# Patient Record
Sex: MALE | Race: WHITE | Employment: FULL TIME | URBAN - METROPOLITAN AREA
[De-identification: names, ages, dates, MRNs, and addresses within clinical notes are randomized per-mention and may not be internally consistent; named-entity substitution may affect disease eponyms.]

---

## 2019-09-02 LAB
CHOLESTEROL, TOTAL: 148 MG/DL
CHOLESTEROL/HDL RATIO: 5.3
GLUCOSE BLD-MCNC: 93 MG/DL
HDLC SERPL-MCNC: 28 MG/DL (ref 35–70)
LDL CHOLESTEROL CALCULATED: ABNORMAL
TRIGL SERPL-MCNC: 45 MG/DL
VLDLC SERPL CALC-MCNC: ABNORMAL MG/DL

## 2019-11-21 ENCOUNTER — OFFICE VISIT (OUTPATIENT)
Dept: PRIMARY CARE CLINIC | Age: 29
End: 2019-11-21
Payer: COMMERCIAL

## 2019-11-21 VITALS
HEIGHT: 69 IN | DIASTOLIC BLOOD PRESSURE: 78 MMHG | OXYGEN SATURATION: 98 % | WEIGHT: 197.4 LBS | RESPIRATION RATE: 17 BRPM | SYSTOLIC BLOOD PRESSURE: 118 MMHG | TEMPERATURE: 98.1 F | HEART RATE: 72 BPM | BODY MASS INDEX: 29.24 KG/M2

## 2019-11-21 DIAGNOSIS — K58.8 OTHER IRRITABLE BOWEL SYNDROME: ICD-10-CM

## 2019-11-21 DIAGNOSIS — K14.8 TONGUE LESION: Primary | ICD-10-CM

## 2019-11-21 PROBLEM — K58.9 IRRITABLE BOWEL DISEASE: Status: ACTIVE | Noted: 2019-11-21

## 2019-11-21 PROCEDURE — 99203 OFFICE O/P NEW LOW 30 MIN: CPT | Performed by: INTERNAL MEDICINE

## 2019-11-21 SDOH — HEALTH STABILITY: MENTAL HEALTH: HOW OFTEN DO YOU HAVE A DRINK CONTAINING ALCOHOL?: NEVER

## 2019-11-21 ASSESSMENT — ENCOUNTER SYMPTOMS
ABDOMINAL PAIN: 0
WHEEZING: 0
CHEST TIGHTNESS: 0
SORE THROAT: 0
SINUS PRESSURE: 0
SINUS PAIN: 0
VOMITING: 0
RHINORRHEA: 0
COUGH: 0
EYE DISCHARGE: 0
EYE PAIN: 0
BLOOD IN STOOL: 0
SHORTNESS OF BREATH: 0
TROUBLE SWALLOWING: 0
NAUSEA: 0

## 2019-11-26 ENCOUNTER — OFFICE VISIT (OUTPATIENT)
Dept: ENT CLINIC | Age: 29
End: 2019-11-26
Payer: COMMERCIAL

## 2019-11-26 VITALS
BODY MASS INDEX: 27.46 KG/M2 | WEIGHT: 191.8 LBS | DIASTOLIC BLOOD PRESSURE: 68 MMHG | HEART RATE: 72 BPM | SYSTOLIC BLOOD PRESSURE: 105 MMHG | HEIGHT: 70 IN | TEMPERATURE: 97 F

## 2019-11-26 DIAGNOSIS — K14.8 TONGUE LESION: Primary | ICD-10-CM

## 2019-11-26 DIAGNOSIS — G47.30 SLEEP-DISORDERED BREATHING: ICD-10-CM

## 2019-11-26 DIAGNOSIS — J35.8 TONSIL STONE: ICD-10-CM

## 2019-11-26 PROCEDURE — 99204 OFFICE O/P NEW MOD 45 MIN: CPT | Performed by: OTOLARYNGOLOGY

## 2019-12-05 ENCOUNTER — TELEPHONE (OUTPATIENT)
Dept: ENT CLINIC | Age: 29
End: 2019-12-05

## 2019-12-09 ENCOUNTER — OFFICE VISIT (OUTPATIENT)
Dept: ENT CLINIC | Age: 29
End: 2019-12-09
Payer: COMMERCIAL

## 2019-12-09 VITALS
BODY MASS INDEX: 28.2 KG/M2 | WEIGHT: 197 LBS | HEART RATE: 90 BPM | SYSTOLIC BLOOD PRESSURE: 128 MMHG | TEMPERATURE: 97.3 F | DIASTOLIC BLOOD PRESSURE: 72 MMHG | HEIGHT: 70 IN

## 2019-12-09 DIAGNOSIS — K14.0 GLOSSITIS: Primary | ICD-10-CM

## 2019-12-09 DIAGNOSIS — J35.8 TONSIL STONE: ICD-10-CM

## 2019-12-09 DIAGNOSIS — K14.0 GLOSSITIS: ICD-10-CM

## 2019-12-09 DIAGNOSIS — R06.83 PRIMARY SNORING: ICD-10-CM

## 2019-12-09 PROCEDURE — 99213 OFFICE O/P EST LOW 20 MIN: CPT | Performed by: OTOLARYNGOLOGY

## 2019-12-10 LAB
ANTI-NUCLEAR ANTIBODY (ANA): NEGATIVE
FOLATE: 7.62 NG/ML (ref 4.78–24.2)
IRON SATURATION: 30 % (ref 20–50)
IRON: 81 UG/DL (ref 59–158)
TOTAL IRON BINDING CAPACITY: 272 UG/DL (ref 260–445)
VITAMIN B-12: 419 PG/ML (ref 211–911)

## 2019-12-11 ENCOUNTER — OFFICE VISIT (OUTPATIENT)
Dept: PRIMARY CARE CLINIC | Age: 29
End: 2019-12-11
Payer: COMMERCIAL

## 2019-12-11 VITALS
BODY MASS INDEX: 27.66 KG/M2 | DIASTOLIC BLOOD PRESSURE: 82 MMHG | SYSTOLIC BLOOD PRESSURE: 112 MMHG | WEIGHT: 190 LBS | OXYGEN SATURATION: 98 % | TEMPERATURE: 97.6 F | HEART RATE: 67 BPM

## 2019-12-11 DIAGNOSIS — M79.642 HAND PAIN, LEFT: ICD-10-CM

## 2019-12-11 PROCEDURE — 99212 OFFICE O/P EST SF 10 MIN: CPT | Performed by: INTERNAL MEDICINE

## 2019-12-11 ASSESSMENT — PATIENT HEALTH QUESTIONNAIRE - PHQ9
2. FEELING DOWN, DEPRESSED OR HOPELESS: 0
SUM OF ALL RESPONSES TO PHQ QUESTIONS 1-9: 0
SUM OF ALL RESPONSES TO PHQ QUESTIONS 1-9: 0
SUM OF ALL RESPONSES TO PHQ9 QUESTIONS 1 & 2: 0
1. LITTLE INTEREST OR PLEASURE IN DOING THINGS: 0

## 2019-12-30 ENCOUNTER — TELEPHONE (OUTPATIENT)
Dept: ENT CLINIC | Age: 29
End: 2019-12-30

## 2019-12-30 NOTE — TELEPHONE ENCOUNTER
Having issues with his tongue again, but is out of town (in [place]) and would like to get a biopsy done out there so really just needs to talk to Dr. Rigo Clay. He also said that he was going to start calling places around 11 am to see if he can get in before the end of the year. Please call back at the following number [phone].

## 2019-12-31 NOTE — TELEPHONE ENCOUNTER
Patient called this morning, he would like to speak with Dr Sallie Villafana before 10:30 am if possible. Please call patient at [phone].

## 2020-10-22 ENCOUNTER — OFFICE VISIT (OUTPATIENT)
Dept: PRIMARY CARE CLINIC | Age: 30
End: 2020-10-22
Payer: COMMERCIAL

## 2020-10-22 VITALS
DIASTOLIC BLOOD PRESSURE: 72 MMHG | WEIGHT: 175 LBS | BODY MASS INDEX: 25.05 KG/M2 | SYSTOLIC BLOOD PRESSURE: 124 MMHG | TEMPERATURE: 98.1 F | OXYGEN SATURATION: 98 % | HEART RATE: 65 BPM | HEIGHT: 70 IN | RESPIRATION RATE: 14 BRPM

## 2020-10-22 PROBLEM — M79.642 HAND PAIN, LEFT: Status: RESOLVED | Noted: 2019-12-11 | Resolved: 2020-10-22

## 2020-10-22 PROBLEM — L20.84 INTRINSIC ECZEMA: Status: ACTIVE | Noted: 2020-10-22

## 2020-10-22 PROBLEM — K58.9 IRRITABLE BOWEL DISEASE: Status: RESOLVED | Noted: 2019-11-21 | Resolved: 2020-10-22

## 2020-10-22 PROCEDURE — G8484 FLU IMMUNIZE NO ADMIN: HCPCS | Performed by: INTERNAL MEDICINE

## 2020-10-22 PROCEDURE — 99395 PREV VISIT EST AGE 18-39: CPT | Performed by: INTERNAL MEDICINE

## 2020-10-22 RX ORDER — TRIAMCINOLONE ACETONIDE 5 MG/G
OINTMENT TOPICAL
Qty: 30 G | Refills: 0 | Status: SHIPPED | OUTPATIENT
Start: 2020-10-22 | End: 2020-10-29

## 2020-10-22 SDOH — HEALTH STABILITY: PHYSICAL HEALTH: ON AVERAGE, HOW MANY MINUTES DO YOU ENGAGE IN EXERCISE AT THIS LEVEL?: 40 MIN

## 2020-10-22 SDOH — HEALTH STABILITY: PHYSICAL HEALTH: ON AVERAGE, HOW MANY DAYS PER WEEK DO YOU ENGAGE IN MODERATE TO STRENUOUS EXERCISE (LIKE A BRISK WALK)?: 4 DAYS

## 2020-10-22 ASSESSMENT — PATIENT HEALTH QUESTIONNAIRE - PHQ9
SUM OF ALL RESPONSES TO PHQ9 QUESTIONS 1 & 2: 0
1. LITTLE INTEREST OR PLEASURE IN DOING THINGS: 0
SUM OF ALL RESPONSES TO PHQ QUESTIONS 1-9: 0
SUM OF ALL RESPONSES TO PHQ QUESTIONS 1-9: 0
2. FEELING DOWN, DEPRESSED OR HOPELESS: 0
SUM OF ALL RESPONSES TO PHQ QUESTIONS 1-9: 0

## 2020-10-22 ASSESSMENT — ENCOUNTER SYMPTOMS
ABDOMINAL PAIN: 0
CHEST TIGHTNESS: 0
EYE PAIN: 0
TROUBLE SWALLOWING: 0
VOMITING: 0
WHEEZING: 0
RHINORRHEA: 0
EYE DISCHARGE: 0
SINUS PRESSURE: 0
SHORTNESS OF BREATH: 0
NAUSEA: 0
SORE THROAT: 0
SINUS PAIN: 0
COUGH: 0
BLOOD IN STOOL: 0

## 2020-10-22 NOTE — PATIENT INSTRUCTIONS
[place] with all fiber 1 cereal every morning. Smooth texture Metamucil 1 tablespoon once a day. Increase fiber to help with the bowels. Alternating Eucerin cream with triamcinolone cream twice a day. Not much soap on the rash area. If not better call us and we can get him to see dermatologist.    Keep 4 days a week 3 to 4 miles cardio exercise.

## 2020-10-22 NOTE — PROGRESS NOTES
facial asymmetry, weakness, light-headedness, numbness and headaches. Psychiatric/Behavioral: Negative for confusion. No current outpatient medications on file prior to visit. No current facility-administered medications on file prior to visit. Past Medical History:   Diagnosis Date    Allergic rhinitis     Fracture of nasal bone     Intrinsic eczema 10/22/2020      Social History     Tobacco Use    Smoking status: Never Smoker    Smokeless tobacco: Never Used   Substance Use Topics    Alcohol use: Never     Frequency: Never      Family History   Problem Relation Age of Onset    Other Mother         sleep apnea    Sleep Apnea Father     Stroke Paternal Grandfather     Other Other         Vitals:    10/22/20 1119   BP: 124/72   Site: Right Upper Arm   Position: Sitting   Cuff Size: Medium Adult   Pulse: 65   Resp: 14   Temp: 98.1 °F (36.7 °C)   TempSrc: Temporal   SpO2: 98%   Weight: 175 lb (79.4 kg)   Height: 5' 9.8\" (1.773 m)     Estimated body mass index is 25.25 kg/m² as calculated from the following:    Height as of this encounter: 5' 9.8\" (1.773 m). Weight as of this encounter: 175 lb (79.4 kg). Physical Exam  Vitals signs and nursing note reviewed. Constitutional:       General: He is not in acute distress. HENT:      Head: Normocephalic and atraumatic. Right Ear: External ear normal.      Left Ear: External ear normal.   Eyes:      General: Lids are normal.      Conjunctiva/sclera: Conjunctivae normal.      Pupils: Pupils are equal, round, and reactive to light. Neck:      Musculoskeletal: Neck supple. Thyroid: No thyromegaly. Vascular: No JVD. Trachea: No tracheal deviation. Cardiovascular:      Rate and Rhythm: Normal rate and regular rhythm. Heart sounds: Normal heart sounds. No gallop. Pulmonary:      Effort: Pulmonary effort is normal. No respiratory distress. Breath sounds: Normal breath sounds. No wheezing or rales.    Abdominal: General: Bowel sounds are normal.      Palpations: Abdomen is soft. There is no mass. Tenderness: There is no abdominal tenderness. Musculoskeletal:         General: No tenderness. Comments: No leg edema or calf tenderness   Lymphadenopathy:      Cervical: No cervical adenopathy. Skin:     General: Skin is warm and dry. Findings: Rash (  Right buttock top of the intergluteal area eczematous rash-kacey size) present. Neurological:      Mental Status: He is alert and oriented to person, place, and time. Cranial Nerves: No cranial nerve deficit. Sensory: No sensory deficit. Psychiatric:         Behavior: Behavior normal.         Thought Content: Thought content normal.         ASSESSMENT/PLAN:  1. Annual physical exam  As explained    2. Intrinsic eczema    - triamcinolone (ARISTOCORT) 0.5 % ointment; Apply topically 2 times daily. Dispense: 30 g; Refill: 0      Return in about 1 year (around 10/22/2021) for Annual follow-up. Patient Instructions   [place] with all fiber 1 cereal every morning. Smooth texture Metamucil 1 tablespoon once a day. Increase fiber to help with the bowels. Alternating Eucerin cream with triamcinolone cream twice a day. Not much soap on the rash area. If not better call us and we can get him to see dermatologist.    Keep 4 days a week 3 to 4 miles cardio exercise. Electronically signed by Elfego Miguel MD on 10/22/2020 at 11:51 AM     This dictation was generated by voice recognition computer software. Although all attempts are made to edit the dictation for accuracy, there may be errors in the transcription that are not intended.

## 2021-04-15 ENCOUNTER — OFFICE VISIT (OUTPATIENT)
Dept: PRIMARY CARE CLINIC | Age: 31
End: 2021-04-15
Payer: COMMERCIAL

## 2021-04-15 VITALS
OXYGEN SATURATION: 98 % | HEIGHT: 69 IN | DIASTOLIC BLOOD PRESSURE: 70 MMHG | TEMPERATURE: 97.8 F | BODY MASS INDEX: 26.16 KG/M2 | HEART RATE: 78 BPM | SYSTOLIC BLOOD PRESSURE: 116 MMHG | WEIGHT: 176.6 LBS | RESPIRATION RATE: 17 BRPM

## 2021-04-15 DIAGNOSIS — Z00.00 ANNUAL PHYSICAL EXAM: ICD-10-CM

## 2021-04-15 PROCEDURE — 99395 PREV VISIT EST AGE 18-39: CPT | Performed by: INTERNAL MEDICINE

## 2021-04-15 ASSESSMENT — ENCOUNTER SYMPTOMS
BLOOD IN STOOL: 0
EYE DISCHARGE: 0
SINUS PAIN: 0
WHEEZING: 0
EYE PAIN: 0
NAUSEA: 0
SINUS PRESSURE: 0
ABDOMINAL PAIN: 0
COUGH: 0
RHINORRHEA: 0
SORE THROAT: 0
VOMITING: 0
TROUBLE SWALLOWING: 0
SHORTNESS OF BREATH: 0
CHEST TIGHTNESS: 0

## 2021-04-15 ASSESSMENT — PATIENT HEALTH QUESTIONNAIRE - PHQ9
2. FEELING DOWN, DEPRESSED OR HOPELESS: 0
SUM OF ALL RESPONSES TO PHQ QUESTIONS 1-9: 0

## 2021-04-15 NOTE — PATIENT INSTRUCTIONS
Keep regular exercise 3-4 days  q week. Lot of water--64-80 oz water a day. Lot of vegetables/ lean meats .     covid 19 vaccine

## 2021-04-15 NOTE — PROGRESS NOTES
4/15/2021     Joy Vasquez (: 1990) is a 26 y.o. male, here for evaluation of the following medical concerns:    Chief Complaint   Patient presents with    Annual Exam        Annual physical--  Keeping healthy habits--he has been exercising regularly and he has changed his eating habits and he is limiting sugar or fried stuff and keeping a healthy weight. Review of systems completely unremarkable and his blood work reviewed from annual labs and explained to him at length about his HDL slightly decreased to 36 which he can improve with exercise and his sugar 99 and LDL cholesterol 52 is very good. His blood pressure outside has been doing very good at 109/64. His waist circumference is 34 stable      Review of Systems   Constitutional: Negative for appetite change, chills, fever and unexpected weight change. HENT: Negative for congestion, ear discharge, ear pain, nosebleeds, rhinorrhea, sinus pressure, sinus pain, sore throat and trouble swallowing. Eyes: Negative for pain and discharge. Respiratory: Negative for cough, chest tightness, shortness of breath and wheezing. Cardiovascular: Negative for chest pain, palpitations and leg swelling. Gastrointestinal: Negative for abdominal pain, blood in stool, nausea and vomiting. Endocrine: Negative for polydipsia and polyphagia. Genitourinary: Negative for difficulty urinating, enuresis, flank pain and hematuria. Musculoskeletal: Negative for myalgias. Skin: Negative for rash. Neurological: Negative for facial asymmetry, weakness, light-headedness, numbness and headaches. Psychiatric/Behavioral: Negative for confusion. No current outpatient medications on file prior to visit. No current facility-administered medications on file prior to visit.        Past Medical History:   Diagnosis Date    Allergic rhinitis     Fracture of nasal bone     Intrinsic eczema 10/22/2020      Social History     Tobacco Use    Smoking status: Never Smoker    Smokeless tobacco: Never Used   Substance Use Topics    Alcohol use: Never     Frequency: Never      Family History   Problem Relation Age of Onset    Other Mother         sleep apnea    Sleep Apnea Father     Stroke Paternal Grandfather     Other Other         Vitals:    04/15/21 1648   BP: 116/70   Site: Left Upper Arm   Position: Sitting   Cuff Size: Medium Adult   Pulse: 78   Resp: 17   Temp: 97.8 °F (36.6 °C)   SpO2: 98%   Weight: 176 lb 9.6 oz (80.1 kg)   Height: 5' 9\" (1.753 m)     Estimated body mass index is 26.08 kg/m² as calculated from the following:    Height as of this encounter: 5' 9\" (1.753 m). Weight as of this encounter: 176 lb 9.6 oz (80.1 kg). Physical Exam  Vitals signs and nursing note reviewed. Constitutional:       General: He is not in acute distress. HENT:      Head: Normocephalic and atraumatic. Right Ear: Tympanic membrane, ear canal and external ear normal.      Left Ear: Tympanic membrane, ear canal and external ear normal.   Eyes:      General: Lids are normal.      Extraocular Movements: Extraocular movements intact. Conjunctiva/sclera: Conjunctivae normal.      Pupils: Pupils are equal, round, and reactive to light. Neck:      Musculoskeletal: Neck supple. Thyroid: No thyromegaly. Vascular: No JVD. Trachea: No tracheal deviation. Cardiovascular:      Rate and Rhythm: Normal rate and regular rhythm. Heart sounds: Normal heart sounds. No gallop. Pulmonary:      Effort: Pulmonary effort is normal. No respiratory distress. Breath sounds: Normal breath sounds. No wheezing or rales. Abdominal:      General: Bowel sounds are normal.      Palpations: Abdomen is soft. There is no mass. Tenderness: There is no abdominal tenderness. Musculoskeletal:         General: No tenderness. Comments: No leg edema or calf tenderness   Lymphadenopathy:      Cervical: No cervical adenopathy.    Skin:     General: Skin is warm and dry. Findings: No rash. Neurological:      General: No focal deficit present. Mental Status: He is alert and oriented to person, place, and time. Cranial Nerves: No cranial nerve deficit. Sensory: No sensory deficit. Psychiatric:         Mood and Affect: Mood normal.         Behavior: Behavior normal.         Thought Content: Thought content normal.         Judgment: Judgment normal.         ASSESSMENT/PLAN:  1. Annual physical exam  Reg exercise      Return in about 1 year (around 4/18/2022) for annual physical.     Patient Instructions   Keep regular exercise 3-4 days  q week. Lot of water--64-80 oz water a day. Lot of vegetables/ lean meats . covid 19 vaccine               Electronically signed by Brittany Davis MD on 4/19/2021 at 9:20 AM     This dictation was generated by voice recognition computer software. Although all attempts are made to edit the dictation for accuracy, there may be errors in the transcription that are not intended.

## 2021-05-15 PROBLEM — Z00.00 ANNUAL PHYSICAL EXAM: Status: RESOLVED | Noted: 2021-04-15 | Resolved: 2021-05-15

## 2022-03-23 ENCOUNTER — PATIENT MESSAGE (OUTPATIENT)
Dept: INTERNAL MEDICINE CLINIC | Age: 32
End: 2022-03-23

## 2022-03-24 NOTE — TELEPHONE ENCOUNTER
From: Layla Newman  To: Dr. Sommer Stoddard: 3/23/2022 2:09 PM EDT  Subject: Alzheimers risk concern    Hi Dr. Chetan Smith,    Alzheimer's runs heavily in my family, and I ran across some headlines today about some new studies that talk about how certain health indicators in my mid-30s could be linked to future risk of Alzheimer's (see [facility] q=Glucose+cholesterol+Alzheimer%27s+risk). I'd like to be proactive as best as I can to reduce my risk of Alzheimer's in these indicators as well as all other areas of my life (I've heard sleep also is an indicator). I don't have my annual physical scheduled yet, but I plan to do my biometric screening in the near future (I will send to you once completed) and I will schedule my physical soon after. Could you prepare my file to discuss this at my next Physical appointment? Thank you!   Layla Newman

## 2022-05-25 ENCOUNTER — PATIENT MESSAGE (OUTPATIENT)
Dept: INTERNAL MEDICINE CLINIC | Age: 32
End: 2022-05-25

## 2022-05-25 NOTE — TELEPHONE ENCOUNTER
From: Estefania Mcnair  To: Dr. Desirae Vickers: 5/25/2022 2:47 PM EDT  Subject: Transfer PCP to LELA Haddad CNP    Hi Dr. Murali Wesley,    I started trying to set up a physical with you, but I realized that your location is just to far to justify staying with you. I'd like to [facility], and make LELA Haddad CNP my PCP. Can you transfer my records to her so I can be her patient and have a physical with her instead? Thanks for everything, and good luck with your new location!   Estefania Mcnair

## 2022-07-20 ENCOUNTER — OFFICE VISIT (OUTPATIENT)
Dept: PRIMARY CARE CLINIC | Age: 32
End: 2022-07-20
Payer: COMMERCIAL

## 2022-07-20 VITALS
HEIGHT: 69 IN | DIASTOLIC BLOOD PRESSURE: 60 MMHG | WEIGHT: 181.6 LBS | BODY MASS INDEX: 26.9 KG/M2 | HEART RATE: 64 BPM | OXYGEN SATURATION: 99 % | SYSTOLIC BLOOD PRESSURE: 104 MMHG

## 2022-07-20 DIAGNOSIS — Z00.00 HEALTH CARE MAINTENANCE: ICD-10-CM

## 2022-07-20 DIAGNOSIS — Z00.00 ANNUAL PHYSICAL EXAM: Primary | ICD-10-CM

## 2022-07-20 DIAGNOSIS — L20.84 INTRINSIC ECZEMA: ICD-10-CM

## 2022-07-20 PROCEDURE — 99395 PREV VISIT EST AGE 18-39: CPT | Performed by: NURSE PRACTITIONER

## 2022-07-20 RX ORDER — TRIAMCINOLONE ACETONIDE 5 MG/G
1 OINTMENT TOPICAL DAILY PRN
COMMUNITY

## 2022-07-20 ASSESSMENT — ENCOUNTER SYMPTOMS
WHEEZING: 0
CONSTIPATION: 0
CHEST TIGHTNESS: 0
SHORTNESS OF BREATH: 0
DIARRHEA: 0

## 2022-07-20 ASSESSMENT — PATIENT HEALTH QUESTIONNAIRE - PHQ9
SUM OF ALL RESPONSES TO PHQ QUESTIONS 1-9: 0
9. THOUGHTS THAT YOU WOULD BE BETTER OFF DEAD, OR OF HURTING YOURSELF: 0
1. LITTLE INTEREST OR PLEASURE IN DOING THINGS: 0
4. FEELING TIRED OR HAVING LITTLE ENERGY: 0
5. POOR APPETITE OR OVEREATING: 0
SUM OF ALL RESPONSES TO PHQ9 QUESTIONS 1 & 2: 0
SUM OF ALL RESPONSES TO PHQ QUESTIONS 1-9: 0
2. FEELING DOWN, DEPRESSED OR HOPELESS: 0
10. IF YOU CHECKED OFF ANY PROBLEMS, HOW DIFFICULT HAVE THESE PROBLEMS MADE IT FOR YOU TO DO YOUR WORK, TAKE CARE OF THINGS AT HOME, OR GET ALONG WITH OTHER PEOPLE: 0
3. TROUBLE FALLING OR STAYING ASLEEP: 0
SUM OF ALL RESPONSES TO PHQ QUESTIONS 1-9: 0
7. TROUBLE CONCENTRATING ON THINGS, SUCH AS READING THE NEWSPAPER OR WATCHING TELEVISION: 0
SUM OF ALL RESPONSES TO PHQ QUESTIONS 1-9: 0
8. MOVING OR SPEAKING SO SLOWLY THAT OTHER PEOPLE COULD HAVE NOTICED. OR THE OPPOSITE, BEING SO FIGETY OR RESTLESS THAT YOU HAVE BEEN MOVING AROUND A LOT MORE THAN USUAL: 0
6. FEELING BAD ABOUT YOURSELF - OR THAT YOU ARE A FAILURE OR HAVE LET YOURSELF OR YOUR FAMILY DOWN: 0

## 2022-07-20 NOTE — PROGRESS NOTES
2022    Chief Complaint   Patient presents with    Annual Exam     New pt       Shameka Later is a 33 y.o. male, presents today for annual physical exam. Patient is an established patient with [facility], however new to me. HPI   Annual physical--  Keeping healthy habits--he has been exercising regularly and he has changed his eating habits and he is limiting sugar or fried stuff and keeping a healthy weight. Has a garden with a lot of vegetables. Patient's wife cooks healthy meals at home. Patient rarely eats at restaurants. Review of systems completely unremarkable and his blood work reviewed from annual labs and explained to him at length about his HDL slightly decreased to 38 (has increased compared to last year, 36) and his sugar 86 and LDL cholesterol 97 is very good. His blood pressure outside has been doing very good at 109/64. His waist circumference is 34 stable. Recent lab results from 3/25/2022 from Biometric screening. Results scanned into media. Glucose: 86  TC: 149  T  HDL: 38  LDL: 97  BMI: 26.6  Waist circumference: 34    Patient has concern for familial Alzheimer's disease (maternal grandmother with history of Alzheimer's Disease, maternal grandfather had dementia). Patient instructed to continue healthy lifestyle with healthy well balanced diet, regular exercise, healthy BMI, read/puzzles, measures to avoid developing diabetes, hypertension, hyperlipidemia. Patient verbalized understanding and denied having any additional questions or concerns. Atopic Dermatitis  History of eczema. He occasionally applies Triamcinolone 0.5% ointment once daily as needed. Ointment has been effective in the past. Will check to see if he needs refill. History of asymptomatic hemorrhoid  Pertinent negative include rectal bleeding, rectal itching or burning. Occasional streak of blood on toilet paper after bowel movement.  At previous physical exam patient was instructed to start Metamucil 1 tablespoon once a day and increase fiber to help with the bowels-- reminded to continue regular daily fiber. Patient to follow up if symptomatic. Review of Systems   Constitutional:  Negative for activity change, appetite change, fatigue and unexpected weight change. HENT: Negative. Eyes:  Negative for visual disturbance. Respiratory:  Negative for chest tightness, shortness of breath and wheezing. Cardiovascular:  Negative for chest pain, palpitations and leg swelling. Gastrointestinal:  Negative for constipation and diarrhea. Endocrine: Negative for cold intolerance and heat intolerance. Genitourinary: Negative. Musculoskeletal:  Negative for arthralgias and myalgias. Skin: Negative. Neurological:  Negative for dizziness, tremors, weakness, light-headedness and headaches. Psychiatric/Behavioral:  Negative for dysphoric mood and sleep disturbance. The patient is not nervous/anxious. Current Outpatient Medications on File Prior to Visit   Medication Sig Dispense Refill    triamcinolone (ARISTOCORT) 0.5 % ointment Apply 1 applicator topically daily as needed Apply topically 2 times daily. No current facility-administered medications on file prior to visit. Allergies   Allergen Reactions    Latex Other (See Comments)     Burning sensation to face with glove exposure (dental cleaning, blowing up balloons)     Past Medical History:   Diagnosis Date    Allergic rhinitis     Fracture of nasal bone     Intrinsic eczema 10/22/2020     History reviewed. No pertinent surgical history.    Social History     Tobacco Use    Smoking status: Never    Smokeless tobacco: Never   Substance Use Topics    Alcohol use: Never      Family History   Problem Relation Age of Onset    Sleep Apnea Mother     Sleep Apnea Father     Alzheimer's Disease Maternal Grandmother 68        showed signs mid-70s    Dementia Maternal Grandfather     Stroke Paternal Grandfather No Known Problems Son     No Known Problems Son     No Known Problems Son     Other Other         Vitals:    07/20/22 0823   BP: 104/60   Site: Left Upper Arm   Position: Sitting   Cuff Size: Medium Adult   Pulse: 64   SpO2: 99%   Weight: 181 lb 9.6 oz (82.4 kg)   Height: 5' 9.1\" (1.755 m)     Estimated body mass index is 26.74 kg/m² as calculated from the following:    Height as of this encounter: 5' 9.1\" (1.755 m). Weight as of this encounter: 181 lb 9.6 oz (82.4 kg). Physical Exam  Vitals and nursing note reviewed. Constitutional:       General: He is not in acute distress. Appearance: Normal appearance. HENT:      Head: Normocephalic. Right Ear: Tympanic membrane, ear canal and external ear normal.      Left Ear: Tympanic membrane, ear canal and external ear normal.      Nose: Nose normal.      Mouth/Throat:      Mouth: Mucous membranes are moist.      Pharynx: Oropharynx is clear. Eyes:      Extraocular Movements: Extraocular movements intact. Conjunctiva/sclera: Conjunctivae normal.      Pupils: Pupils are equal, round, and reactive to light. Neck:      Vascular: No carotid bruit. Cardiovascular:      Rate and Rhythm: Normal rate and regular rhythm. Pulses: Normal pulses. Heart sounds: Normal heart sounds. Pulmonary:      Effort: Pulmonary effort is normal.      Breath sounds: Normal breath sounds. Abdominal:      General: Bowel sounds are normal.      Palpations: Abdomen is soft. Musculoskeletal:         General: Normal range of motion. Cervical back: Normal range of motion and neck supple. No rigidity. Right lower leg: No edema. Left lower leg: No edema. Lymphadenopathy:      Cervical: No cervical adenopathy. Skin:     General: Skin is warm. Findings: Rash present. Neurological:      General: No focal deficit present. Mental Status: He is alert and oriented to person, place, and time. Mental status is at baseline. Psychiatric:         Mood and Affect: Mood normal.         Behavior: Behavior normal.         Thought Content: Thought content normal.       ASSESSMENT/PLAN:  1. Annual physical exam  - Healthy 33year old examination without abnormalities. 2. Health care maintenance  - Start Multiple Vitamins-Minerals (MENS MULTI VITAMIN & MINERAL) TABS; Take 1 tablet daily with a meal    3. Intrinsic eczema  - Active  - Continue Triamcinolone ointment daily as needed- call or send Flit message if . Return in about 1 year (around 2023) for annual physical exam or sooner if needed. Discussed use, benefit, and side effects of prescribed medications. Patient's questions answered and concerns addressed. Patient agrees to plan of care. My scheduled days in the office reviewed with patient, and same day appointments available. Encouraged to use Flit for communication as needed. Electronically signed by LELA Roberts CNP on 2022 at 9:24 AM       This dictation was generated by voice recognition computer software. Although all attempts are made to edit the dictation for accuracy, there may be errors in the transcription that are not intended.

## 2022-11-04 ENCOUNTER — PATIENT MESSAGE (OUTPATIENT)
Dept: PRIMARY CARE CLINIC | Age: 32
End: 2022-11-04

## 2022-11-04 DIAGNOSIS — Z82.49 FAMILY HISTORY OF HEART DISEASE: Primary | ICD-10-CM

## 2022-11-07 ENCOUNTER — TELEPHONE (OUTPATIENT)
Dept: CARDIOLOGY CLINIC | Age: 32
End: 2022-11-07

## 2022-11-07 NOTE — TELEPHONE ENCOUNTER
From: Yamini Malone  To: Anel Mast  Sent: 11/4/2022 4:04 PM EDT  Subject: Heart Preventive Screening    Hi Dr. Tobi Sparks,  I recently attended a [employer]-sponsored event at [address] [place] [address], and there was a table that talked about the [address] [place] [address] next door to your [facility] location. They told me about how I can do preventive screenings without insurance for like $25 or $50 (I can't remember exactly how much). My family is hitting their max OOP this year, so I might do the Preventive Screening billed through insurance. Anyway, I was interested, and recently tried to call to set up an appointment. The person I spoke to seemed new or confused, but they then said I should contact my primary care doctor first to get an appointment with them. Can you refer me so I can get a preventative screening? See attached for the pamphlet I got from the [facility]. Thank you!   Yamini Malone

## 2022-11-07 NOTE — TELEPHONE ENCOUNTER
SCREENING QUESTIONS:       Do you have a history of cardiovascular disease, this includes any of the following- heart stent and/or open-heart surgery, stroke or abdominal aortic aneurysm? No (If the answer is yes please do not schedule)     Are you currently following up with a cardiologist? No     If no, would you like our office to contact you? No        Do you have a family history of abdominal aortic aneurysm, heart attack or stroke? Yes Grand Parent (Stroke)    Do you have high cholesterol? No    Do you have high blood pressure? No    Do you have diabetes? No    Do you currently smoke or are you a former smoker?  No          WRAP UP:    [x] Scheduled on 12/5  at 8:20 am  at the [facility] office    [x] Instructions given to patient- Nothing to eat or drink 8 hrs prior to appointment and wear loose, comfortable clothing    [] Not scheduled due to previous history themselves    [] Sent to RN pool, has a cardiac history and is not following a cardiologist, would like a follow call

## 2022-12-05 ENCOUNTER — PROCEDURE VISIT (OUTPATIENT)
Dept: CARDIOLOGY CLINIC | Age: 32
End: 2022-12-05

## 2022-12-05 DIAGNOSIS — Z13.6 ENCOUNTER FOR SPECIAL SCREENING EXAMINATION FOR CARDIOVASCULAR DISORDER: Primary | ICD-10-CM

## 2022-12-05 PROCEDURE — MISCABI SCREENING ABI: Performed by: INTERNAL MEDICINE

## 2022-12-20 NOTE — TELEPHONE ENCOUNTER
1. Family history of heart disease  - ECHO Exercise Stress Test; Future    2. Shortness of breath  - ECHO Exercise Stress Test; Future    3.  Intermittent chest pain  - ECHO Exercise Stress Test; Future

## 2022-12-29 ENCOUNTER — HOSPITAL ENCOUNTER (OUTPATIENT)
Dept: NON INVASIVE DIAGNOSTICS | Age: 32
Discharge: HOME | End: 2022-12-29
Payer: COMMERCIAL

## 2022-12-29 ENCOUNTER — TELEPHONE (OUTPATIENT)
Dept: PRIMARY CARE CLINIC | Age: 32
End: 2022-12-29

## 2022-12-29 DIAGNOSIS — Z82.49 FAMILY HISTORY OF HEART DISEASE: ICD-10-CM

## 2022-12-29 DIAGNOSIS — R07.9 INTERMITTENT CHEST PAIN: ICD-10-CM

## 2022-12-29 DIAGNOSIS — R06.02 SHORTNESS OF BREATH: ICD-10-CM

## 2022-12-29 PROCEDURE — 93320 DOPPLER ECHO COMPLETE: CPT

## 2022-12-29 PROCEDURE — 93351 STRESS TTE COMPLETE: CPT

## 2022-12-29 NOTE — TELEPHONE ENCOUNTER
Kandace Coats from [facility] called. She needs the patient's most recent lab results faxed over to [phone] for the doctor to review.

## 2022-12-29 NOTE — TELEPHONE ENCOUNTER
Sergio Alvares [phone], no answer a voice msg was left for them to fax to us the consent form signed by the pt for us to sent to them any labs results.

## 2023-04-27 LAB
CHOLESTEROL, TOTAL: 145 MG/DL
GLUCOSE BLD-MCNC: 89 MG/DL
HDLC SERPL-MCNC: 44 MG/DL (ref 35–70)
LDL CHOLESTEROL DIRECT: 88 MG/DL
TRIGL SERPL-MCNC: 50 MG/DL

## 2023-04-28 ENCOUNTER — OFFICE VISIT (OUTPATIENT)
Dept: PRIMARY CARE CLINIC | Age: 33
End: 2023-04-28
Payer: COMMERCIAL

## 2023-04-28 VITALS
RESPIRATION RATE: 14 BRPM | HEART RATE: 83 BPM | DIASTOLIC BLOOD PRESSURE: 70 MMHG | TEMPERATURE: 97.9 F | HEIGHT: 69 IN | WEIGHT: 181.6 LBS | BODY MASS INDEX: 26.9 KG/M2 | OXYGEN SATURATION: 99 % | SYSTOLIC BLOOD PRESSURE: 102 MMHG

## 2023-04-28 DIAGNOSIS — Z00.00 ANNUAL PHYSICAL EXAM: Primary | ICD-10-CM

## 2023-04-28 DIAGNOSIS — Z13.21 ENCOUNTER FOR VITAMIN DEFICIENCY SCREENING: ICD-10-CM

## 2023-04-28 LAB — 25(OH)D3 SERPL-MCNC: 18.1 NG/ML

## 2023-04-28 PROCEDURE — 99395 PREV VISIT EST AGE 18-39: CPT | Performed by: NURSE PRACTITIONER

## 2023-04-28 SDOH — ECONOMIC STABILITY: INCOME INSECURITY: HOW HARD IS IT FOR YOU TO PAY FOR THE VERY BASICS LIKE FOOD, HOUSING, MEDICAL CARE, AND HEATING?: NOT HARD AT ALL

## 2023-04-28 SDOH — ECONOMIC STABILITY: FOOD INSECURITY: WITHIN THE PAST 12 MONTHS, THE FOOD YOU BOUGHT JUST DIDN'T LAST AND YOU DIDN'T HAVE MONEY TO GET MORE.: NEVER TRUE

## 2023-04-28 SDOH — ECONOMIC STABILITY: HOUSING INSECURITY
IN THE LAST 12 MONTHS, WAS THERE A TIME WHEN YOU DID NOT HAVE A STEADY PLACE TO SLEEP OR SLEPT IN A SHELTER (INCLUDING NOW)?: NO

## 2023-04-28 SDOH — ECONOMIC STABILITY: FOOD INSECURITY: WITHIN THE PAST 12 MONTHS, YOU WORRIED THAT YOUR FOOD WOULD RUN OUT BEFORE YOU GOT MONEY TO BUY MORE.: NEVER TRUE

## 2023-04-28 ASSESSMENT — ENCOUNTER SYMPTOMS
DIARRHEA: 0
CONSTIPATION: 0
SHORTNESS OF BREATH: 0
CHEST TIGHTNESS: 0

## 2023-04-28 ASSESSMENT — PATIENT HEALTH QUESTIONNAIRE - PHQ9
SUM OF ALL RESPONSES TO PHQ QUESTIONS 1-9: 0
SUM OF ALL RESPONSES TO PHQ QUESTIONS 1-9: 0
2. FEELING DOWN, DEPRESSED OR HOPELESS: 0
1. LITTLE INTEREST OR PLEASURE IN DOING THINGS: 0
SUM OF ALL RESPONSES TO PHQ9 QUESTIONS 1 & 2: 0
SUM OF ALL RESPONSES TO PHQ QUESTIONS 1-9: 0
SUM OF ALL RESPONSES TO PHQ QUESTIONS 1-9: 0

## 2023-04-28 NOTE — PROGRESS NOTES
4/28/2023    Chief Complaint   Patient presents with    Annual Exam       Karli Hernandes is a 27 y.o. male, presents today for annual physical exam.     HPI   Annual physical  Kristen Neal is here for his annual physical exam. He had a biometric screening done yesterday at his office. He has healthy habits. He eats a well balance diet most of the time, admitting recently food choices haven't been the healthiest with his wife due with their 4th son in a couple weeks. Every summer he has a garden with a lot of vegetables. He is not exercising regularly as much as he was, however is really active around his home, outdoors and playing with his 3 sons (ages 6, 11, 1). Kristen Neal will upload his biometric screening results to [employer] when reported. Review of Systems   Constitutional:  Negative for activity change, appetite change, fatigue and unexpected weight change. HENT: Negative. Eyes:  Negative for visual disturbance. Respiratory:  Negative for chest tightness and shortness of breath. Cardiovascular:  Negative for chest pain, palpitations and leg swelling. Gastrointestinal:  Negative for constipation and diarrhea. Endocrine: Negative for cold intolerance and heat intolerance. Genitourinary: Negative. Musculoskeletal:  Negative for arthralgias and myalgias. Skin: Negative. Neurological:  Negative for dizziness, tremors, weakness, light-headedness and headaches. Psychiatric/Behavioral:  Negative for dysphoric mood and sleep disturbance. The patient is not nervous/anxious. Current Outpatient Medications on File Prior to Visit   Medication Sig Dispense Refill    triamcinolone (ARISTOCORT) 0.5 % ointment Apply 1 applicator topically daily as needed Apply topically 2 times daily. No current facility-administered medications on file prior to visit.       Allergies   Allergen Reactions    Latex Other (See Comments)     Burning sensation to face with glove exposure (dental cleaning, blowing

## 2023-06-23 ENCOUNTER — PATIENT MESSAGE (OUTPATIENT)
Dept: PRIMARY CARE CLINIC | Age: 33
End: 2023-06-23

## 2023-06-26 ENCOUNTER — TELEPHONE (OUTPATIENT)
Dept: PRIMARY CARE CLINIC | Age: 33
End: 2023-06-26

## 2023-08-25 ENCOUNTER — PATIENT MESSAGE (OUTPATIENT)
Dept: PRIMARY CARE CLINIC | Age: 33
End: 2023-08-25

## 2023-08-25 NOTE — TELEPHONE ENCOUNTER
From: Cruz Lipoma  To: Phi Cuff  Sent: 8/25/2023 2:29 PM EDT  Subject: Vitamin D test denied (continued)    Victor Hugo Garza,    Around the time that we were working through this back in June, I was in the middle of a lot of stuff (including switching jobs and getting ready to move), and I think I forgot to do the next step of calling the billing people to resubmit the claim. Sorry. I got another bill this month, and I called billing to figure out where we were. They said that now they need your office to fax them ([phone] with \"Attn: [address] [place] [address] Dept. \") the Account number (#[id number]) and the new diagnostic code. Thank you. Can you please let me know when you've done this? I can follow up with them afterwards.  Call me if you have any questions: Khai Monge
Letter faxed to number provided- fax confirmation was received. In regard to Mirian Saavedra ( 86)  [address] [place] [address]  Account number [de-identified]    To whom it may concern in [address] [place] [address] [address],     Laboratory test, Vitamin D 25 Hydroxy drawn on 23 was denied by insurance. Please resubmit with new diagnosis code: Fatigue, unspecified type ( R53.83). Please note, he was found to be Vitamin D Deficient, and high dose supplement was prescribed. The lab test above was medically necessary in determining his deficiency. If you have any questions or concerns, please don't hesitate to call.     Sincerely,        Waldo Goldberg, APRN - CNP
Please advise. Is there a different Dx code to submit?
Yes

## 2023-08-28 ENCOUNTER — TELEPHONE (OUTPATIENT)
Dept: PRIMARY CARE CLINIC | Age: 33
End: 2023-08-28

## 2023-08-28 NOTE — TELEPHONE ENCOUNTER
Regarding: Vitamin D test denied (continued)  Contact: [phone]  ----- Message from LELA Abel CNP sent at 8/25/2023  5:52 PM EDT -----  Please scan letter. Thank you.     ----- Message sent from LELA Abel CNP to Claudene Pacas at 8/25/2023  5:51 PM -----   Victor Hugo Crockett,    Hope you and your family are well. I drafted a letter requesting bill be resubmitted with new ICD code, Fatigue. Letter was faxed to the number provided that was addressed to [facility] Department. I did receive confirmation that the fax was received. Please let me know if you have any questions. Have a great weekend,   Shanda Cook      ----- Message -----       From:Keira Canales       Sent:8/25/2023  2:29 PM EDT         To:Samantha Toro    Subject:Vitamin D test denied (continued)    Victor Hugo Cook,    Around the time that we were working through this back in June, I was in the middle of a lot of stuff (including switching jobs and getting ready to move), and I think I forgot to do the next step of calling the billing people to resubmit the claim. Sorry. I got another bill this month, and I called billing to figure out where we were. They said that now they need your office to fax them ([phone] with \"Attn: [facility] Dept. \") the Account number (#[id number]) and the new diagnostic code. Thank you. Can you please let me know when you've done this? I can follow up with them afterwards.  Call me if you have any questions: Khai Monge